# Patient Record
Sex: MALE | Race: OTHER
[De-identification: names, ages, dates, MRNs, and addresses within clinical notes are randomized per-mention and may not be internally consistent; named-entity substitution may affect disease eponyms.]

---

## 2021-12-05 ENCOUNTER — HOSPITAL ENCOUNTER (INPATIENT)
Dept: HOSPITAL 54 - ER | Age: 46
LOS: 3 days | Discharge: HOME | DRG: 720 | End: 2021-12-08
Attending: INTERNAL MEDICINE | Admitting: NURSE PRACTITIONER
Payer: MEDICAID

## 2021-12-05 VITALS — HEIGHT: 68 IN | WEIGHT: 180 LBS | BODY MASS INDEX: 27.28 KG/M2

## 2021-12-05 DIAGNOSIS — B96.20: ICD-10-CM

## 2021-12-05 DIAGNOSIS — Z59.00: ICD-10-CM

## 2021-12-05 DIAGNOSIS — Z20.822: ICD-10-CM

## 2021-12-05 DIAGNOSIS — E66.9: ICD-10-CM

## 2021-12-05 DIAGNOSIS — F10.20: ICD-10-CM

## 2021-12-05 DIAGNOSIS — N10: ICD-10-CM

## 2021-12-05 DIAGNOSIS — E86.0: ICD-10-CM

## 2021-12-05 DIAGNOSIS — E87.2: ICD-10-CM

## 2021-12-05 DIAGNOSIS — K44.9: ICD-10-CM
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
## 2021-12-05 DIAGNOSIS — K57.90: ICD-10-CM

## 2021-12-05 DIAGNOSIS — A41.9: Primary | ICD-10-CM

## 2021-12-05 DIAGNOSIS — E66.01: ICD-10-CM

## 2021-12-05 DIAGNOSIS — N17.0: ICD-10-CM

## 2021-12-05 DIAGNOSIS — F19.10: ICD-10-CM

## 2021-12-05 DIAGNOSIS — F15.10: ICD-10-CM

## 2021-12-05 DIAGNOSIS — D69.6: ICD-10-CM

## 2021-12-05 DIAGNOSIS — E87.1: ICD-10-CM

## 2021-12-05 LAB
ALBUMIN SERPL BCP-MCNC: 3.2 G/DL (ref 3.4–5)
ALP SERPL-CCNC: 87 U/L (ref 46–116)
ALT SERPL W P-5'-P-CCNC: 32 U/L (ref 12–78)
AST SERPL W P-5'-P-CCNC: 19 U/L (ref 15–37)
BASOPHILS # BLD AUTO: 0.1 K/UL (ref 0–0.2)
BASOPHILS NFR BLD AUTO: 0.3 % (ref 0–2)
BILIRUB DIRECT SERPL-MCNC: 0.3 MG/DL (ref 0–0.2)
BILIRUB SERPL-MCNC: 0.7 MG/DL (ref 0.2–1)
BILIRUB UR QL STRIP: NEGATIVE
BUN SERPL-MCNC: 22 MG/DL (ref 7–18)
CALCIUM SERPL-MCNC: 8.8 MG/DL (ref 8.5–10.1)
CHLORIDE SERPL-SCNC: 93 MMOL/L (ref 98–107)
CO2 SERPL-SCNC: 23 MMOL/L (ref 21–32)
COLOR UR: YELLOW
CREAT SERPL-MCNC: 1.5 MG/DL (ref 0.6–1.3)
DEPRECATED SQUAMOUS URNS QL MICRO: (no result) /HPF
EOSINOPHIL NFR BLD AUTO: 0.4 % (ref 0–6)
GLUCOSE SERPL-MCNC: 134 MG/DL (ref 74–106)
GLUCOSE UR STRIP-MCNC: NEGATIVE MG/DL
HCT VFR BLD AUTO: 47 % (ref 39–51)
HGB BLD-MCNC: 16 G/DL (ref 13.5–17.5)
LEUKOCYTE ESTERASE UR QL STRIP: (no result)
LIPASE SERPL-CCNC: 29 U/L (ref 73–393)
LYMPHOCYTES NFR BLD AUTO: 0.5 K/UL (ref 0.8–4.8)
LYMPHOCYTES NFR BLD AUTO: 2.1 % (ref 20–44)
MCHC RBC AUTO-ENTMCNC: 34 G/DL (ref 31–36)
MCV RBC AUTO: 92 FL (ref 80–96)
MONOCYTES NFR BLD AUTO: 1.5 K/UL (ref 0.1–1.3)
MONOCYTES NFR BLD AUTO: 6.5 % (ref 2–12)
NEUTROPHILS # BLD AUTO: 21.6 K/UL (ref 1.8–8.9)
NEUTROPHILS NFR BLD AUTO: 90.7 % (ref 43–81)
NITRITE UR QL STRIP: POSITIVE
PH UR STRIP: 6 [PH] (ref 5–8)
PLATELET # BLD AUTO: 179 K/UL (ref 150–450)
POTASSIUM SERPL-SCNC: 3.6 MMOL/L (ref 3.5–5.1)
PROT SERPL-MCNC: 7.8 G/DL (ref 6.4–8.2)
PROT UR QL STRIP: 100 MG/DL
RBC # BLD AUTO: 5.04 MIL/UL (ref 4.5–6)
RBC #/AREA URNS HPF: (no result) /HPF (ref 0–2)
SODIUM SERPL-SCNC: 130 MMOL/L (ref 136–145)
UROBILINOGEN UR STRIP-MCNC: 2 EU/DL
WBC #/AREA URNS HPF: (no result) /HPF
WBC #/AREA URNS HPF: (no result) /HPF (ref 0–3)
WBC NRBC COR # BLD AUTO: 23.8 K/UL (ref 4.3–11)

## 2021-12-05 PROCEDURE — G0480 DRUG TEST DEF 1-7 CLASSES: HCPCS

## 2021-12-05 PROCEDURE — U0003 INFECTIOUS AGENT DETECTION BY NUCLEIC ACID (DNA OR RNA); SEVERE ACUTE RESPIRATORY SYNDROME CORONAVIRUS 2 (SARS-COV-2) (CORONAVIRUS DISEASE [COVID-19]), AMPLIFIED PROBE TECHNIQUE, MAKING USE OF HIGH THROUGHPUT TECHNOLOGIES AS DESCRIBED BY CMS-2020-01-R: HCPCS

## 2021-12-05 PROCEDURE — G0378 HOSPITAL OBSERVATION PER HR: HCPCS

## 2021-12-05 PROCEDURE — C9803 HOPD COVID-19 SPEC COLLECT: HCPCS

## 2021-12-05 SDOH — ECONOMIC STABILITY - HOUSING INSECURITY: HOMELESSNESS UNSPECIFIED: Z59.00

## 2021-12-05 NOTE — NUR
-------------------------------------------------------------------------------

          *** Note undone in ED - 12/05/21 at 2230 by HUMBERTO ***          

-------------------------------------------------------------------------------

pT IS GOING TO [facility] UNDER THE CARE OF DR. LINDSAY. [facility]

## 2021-12-06 LAB
BASOPHILS # BLD AUTO: 0 K/UL (ref 0–0.2)
BASOPHILS NFR BLD AUTO: 0.3 % (ref 0–2)
BUN SERPL-MCNC: 15 MG/DL (ref 7–18)
CALCIUM SERPL-MCNC: 7.7 MG/DL (ref 8.5–10.1)
CHLORIDE SERPL-SCNC: 99 MMOL/L (ref 98–107)
CO2 SERPL-SCNC: 25 MMOL/L (ref 21–32)
CREAT SERPL-MCNC: 1.4 MG/DL (ref 0.6–1.3)
EOSINOPHIL NFR BLD AUTO: 0 % (ref 0–6)
GLUCOSE SERPL-MCNC: 172 MG/DL (ref 74–106)
HCT VFR BLD AUTO: 40 % (ref 39–51)
HGB BLD-MCNC: 13.9 G/DL (ref 13.5–17.5)
LYMPHOCYTES NFR BLD AUTO: 0.6 K/UL (ref 0.8–4.8)
LYMPHOCYTES NFR BLD AUTO: 3.4 % (ref 20–44)
MAGNESIUM SERPL-MCNC: 2 MG/DL (ref 1.8–2.4)
MCHC RBC AUTO-ENTMCNC: 35 G/DL (ref 31–36)
MCV RBC AUTO: 91 FL (ref 80–96)
MONOCYTES NFR BLD AUTO: 1.3 K/UL (ref 0.1–1.3)
MONOCYTES NFR BLD AUTO: 8 % (ref 2–12)
NEUTROPHILS # BLD AUTO: 15 K/UL (ref 1.8–8.9)
NEUTROPHILS NFR BLD AUTO: 88.3 % (ref 43–81)
PHOSPHATE SERPL-MCNC: 1.7 MG/DL (ref 2.5–4.9)
PLATELET # BLD AUTO: 128 K/UL (ref 150–450)
POTASSIUM SERPL-SCNC: 3.5 MMOL/L (ref 3.5–5.1)
RBC # BLD AUTO: 4.34 MIL/UL (ref 4.5–6)
SODIUM SERPL-SCNC: 131 MMOL/L (ref 136–145)
WBC NRBC COR # BLD AUTO: 16.9 K/UL (ref 4.3–11)

## 2021-12-06 RX ADMIN — SODIUM CHLORIDE PRN MLS/HR: 9 INJECTION, SOLUTION INTRAVENOUS at 15:37

## 2021-12-06 RX ADMIN — ACETAMINOPHEN PRN MG: 325 TABLET ORAL at 11:40

## 2021-12-06 RX ADMIN — HEPARIN SODIUM SCH UNITS: 5000 INJECTION INTRAVENOUS; SUBCUTANEOUS at 20:53

## 2021-12-06 RX ADMIN — DEXTROSE MONOHYDRATE SCH MLS/HR: 50 INJECTION, SOLUTION INTRAVENOUS at 12:52

## 2021-12-06 RX ADMIN — ACETAMINOPHEN PRN MG: 325 TABLET ORAL at 17:01

## 2021-12-06 RX ADMIN — SODIUM CHLORIDE PRN MLS/HR: 9 INJECTION, SOLUTION INTRAVENOUS at 03:00

## 2021-12-06 RX ADMIN — HEPARIN SODIUM SCH UNITS: 5000 INJECTION INTRAVENOUS; SUBCUTANEOUS at 09:38

## 2021-12-06 RX ADMIN — ACETAMINOPHEN PRN MG: 325 TABLET ORAL at 20:29

## 2021-12-06 RX ADMIN — PANTOPRAZOLE SODIUM SCH MG: 40 TABLET, DELAYED RELEASE ORAL at 08:13

## 2021-12-06 NOTE — NUR
RN NOTE



RECEIVED PATIENT FROM ER DEPT. STABLE CONDITION ALERT ORIENTED X 4, AMBULATORY. NO SOB OR 
ANY DISTRESS NOTED AT THIS TIME. IV LINE LEFT AC GAUGE# 18  FLUSHING WELL, RUNNING WITH NS @ 
125 ML/HR. SKIN INTACT. CALL LIGHT WITHIN REACH. BED PLACE IN LOWEST POSITION AND LOCKED. 
SIDE RAILS UP X 2. WILL CONTINUE TO MONITOR.

## 2021-12-06 NOTE — NUR
RN CLOSING NOTES



PT IN BED RESTING COMFORTABLY. PT ALERT/ORIENTED X 4. PT ON RA SAT 96%.NO SOB OR ANY S/SX OF 
DISTRESS NOTED. IV ON LAC RUNNING NS @ 125ML/HR. PT WITH FEVER .6, MAGDALENA NP AWARE, 
TYLENOL GIVEN AT 1700, COOLING MEASURES IN PLACE.  NO EPISODE OF NAUSEA/VOMITING DURING 
SHIFT. ALL SAFETY MEASURES IMPLEMENTED. CALL LIGHT WITHIN REACH. BED IN LOWEST POSITION WITH 
SIDERAILS UP X 2. WILL REPORT TO NIGHT SHIFT RN FOR CONTINUITY OF CARE.

## 2021-12-06 NOTE — NUR
TONY MS OPENING NOTES:

RECEIVED PATIENT FROM DAY SHIFT, PATIENT IN BED, A/O X4, AMBULATORY, L. AC #18 PATENT AND 
INTACT, RUNNING NS  ML/HR. BED IN LOWEST POSITION, SIDE RAILS UP X2, BRAKES LOCKED AND 
IN PLACE, CALL LIGHT WITHIN REACH, WILL CONTINUE TO MONITOR AND IMPLEMENT NURSING 
INTERVENTIONS AS NECESSARY. 

-------------------------------------------------------------------------------

Addendum: 12/07/21 at 0453 by LIGIA VERGARA RN

-------------------------------------------------------------------------------

OPENING NOTES WAS AT 1930

## 2021-12-06 NOTE — NUR
SS Consult:



SS consult for homelessness.

Pt. Is a 46-year-old male.

Pt. demonstrates adequate insight to the reason for hospitalization. 

Pt. was oriented x2, alert, and was not cooperative. During interview, pt. did not make 
appropriate eye-contact, and appeared unkempt. Evidenced by: dirty clothing, messy hair. 
Pt.s speech was at a low rate. 

SW explored pt.s Hx of mental health and substance abuse. 

Pt. reported no Hx of mental health, suicidal or homicidal. Pt. denies auditory 
hallucinations, visual hallucinations, paranoia, or delusions. Pt. reported having Hx of 
substance use [alcohol]. 

SW explored pt.s living situation. Per pt., he lives at [facility] shelter [pt. 
could not provide address]. SW provided homeless resources and pt. denied.



Plan: SW provided available resources and pt. denied. Per pt., he is living in a shelter and 
does not need additional resources.

Once discharge, per pt., he will return to [facility]. 



Resources Provided:



[place] [facility]: 

SP[facility] 2 [facility]

Address: Confidential  (call for location )

Phone Number: [phone]

Population Served: [place]

# of Beds: 57



SPA 4 | [facility]

Provider: [facility]

Address: [address] [place] [address] 

# of Beds: 49

Phone Number: [phone]

Population Served: [place]



[facility] | [facility]

Provider: [facility]

Address: [address] [place] [address] 

# of Beds: 49

Phone Number: [phone]

Population Served: [place]



[facility]

Provider: Rory Joseph [facility]

Address: [address] [facility] [address]

# of Beds: 20

Phone Number: [phone]

Population Served: Women



[facility]

Provider: [facility]

Address: [address] [address]

# of Beds: 30

Phone Number: [phone]

Population Served: Women



SPA 8 | [facility]

Provider: Rob potter [place]mi

Address: [address] [address]

# of Beds: 65

Phone Number: [phone]

Population Served: [place]





Year-round shelters: [facility] [facility] [address] [place], [place] [address] [phone]; 
[facility] [address] [facility] [place] [address]; [facility] [id number] [facility] [address] [phone]

Winter Shelters: 

[facility] [place]

Provider: Rob of [place]

Address: [address] [place], [address]

# of Beds: 47

Phone Number: [phone]

Population Served: [facility] | [facility]

 

Yen Villas[facility] [place]

Provider: [facility]

Address: [address], [address] 

# of Beds: 66

Phone Number: [phone]

Population Served: [facility]

 

[employer] [place]

Provider: [facility]

Address: [address], [address]

# of Beds: 56

Phone Number: [phone]

Population Served: [facility]

 

[facility]adde[facility] [place] 

Provider: /[facility]

Address: [address] [place] [address], [address]

# of Beds: 49

Phone Number: [phone]

Population Served: [facility]

 

[facility] | [facility]

Provider: [facility]

Address: [address] [place] [address], [address]

# of Beds: 37

Phone Number: [phone]

Population Served: [facility]



Hygiene: [facility]: [address] [place] [phone]; [facility] 
[address] [facility] [phone]; [facility] [address], [place] [phone].

Food Resources: [place] [facility] at hospitals- [address] 
[place]; [facility] Each Need [facility] ([facility]) [address]; [facility] [address] [facility]; [facility] 
[address] [facility]. 

Mental Health resources provided: [facility] [address] [place] [address] [phone]; [facility] [address] [facility] [address], 
[place], [place] [id number] [phone]; [facility] 
[address] [place] [address] [phone]; [facility] [address] 
[place], [place] [address] [phone]

Healthcare Clinics: [facility] [address] [facility] [place]. 
[place] [phone]; [facility] [address] [facility] [facility] [facility] [address] 
[place] [address]; [facility] [address] [facility]. [place] [address] [phone]



Counseling--Outpatient

[facility]

[address] [place]

[place] [address] [phone]

(Specializes in in-depth psychotherapy for emotional distress: anxiety, depression, 
interpersonal conflicts, life transitions, childhood abuse)



[facility]

[address] [place], [place] [address] [phone]

(Assist with solving problem marital difficulties, separation & divorce, aging parents, 
death & grief, chronic & terminal illness)



[facility]

[address] [place], [place] [address] [phone]

(Deal with loss & grief, anxiety, marital difficulties) 



Homebound/Mental Health Services

[address] [facility] [address]

[place], [place] [address] [phone]

(Provide in-home mental services to people who are incapable of leaving their homes)



Organization for Needs of the Elderly

Senior Service/Resource Center

[address]

[place] [address] [phone]



[facility] 

[address]

[place], [place] [address] [phone]





Substance Abuse resources provided included: [facility] Substance Abuse 
Self-Helpline (SASH) [phone]; [facility] [address]. [place]. [place] [address] 
[phone]; [facility] [address] [facility] [address] [phone]; 
[facility] [address] [facility] [place] [address] 
[phone]; [facility] [address] [facility] [address] [phone];  [facility] [address] [facility] [address] 
[phone]; [facility] [address] [facility] [address] [phone]; [facility] Substance Abuse Helpline(SAS)[facility] [phone]; [facility]  
[phone]; [facility] [phone] [place]; [facility]  [phone] [place]; [facility]  [phone] [place]; [facility] 
[phone] [facility]; [facility]  [phone] [place]; [facility] [phone] [place]; [facility] [place] [phone] [place]; [facility] 
[phone] [place]; Alcoholics Anonymous [phone]) 714-3448-SFV; [facility] [phone]; Marijuana Anonymous [phone]) 890-9999-SFV; Narcotics Anonymous www.na.org;

## 2021-12-07 VITALS — SYSTOLIC BLOOD PRESSURE: 138 MMHG | DIASTOLIC BLOOD PRESSURE: 70 MMHG

## 2021-12-07 VITALS — DIASTOLIC BLOOD PRESSURE: 70 MMHG | SYSTOLIC BLOOD PRESSURE: 135 MMHG

## 2021-12-07 LAB
BUN SERPL-MCNC: 11 MG/DL (ref 7–18)
CALCIUM SERPL-MCNC: 8.2 MG/DL (ref 8.5–10.1)
CHLORIDE SERPL-SCNC: 101 MMOL/L (ref 98–107)
CO2 SERPL-SCNC: 26 MMOL/L (ref 21–32)
CREAT SERPL-MCNC: 1.2 MG/DL (ref 0.6–1.3)
GLUCOSE SERPL-MCNC: 127 MG/DL (ref 74–106)
PHOSPHATE SERPL-MCNC: 2 MG/DL (ref 2.5–4.9)
POTASSIUM SERPL-SCNC: 3.4 MMOL/L (ref 3.5–5.1)
SODIUM SERPL-SCNC: 135 MMOL/L (ref 136–145)

## 2021-12-07 PROCEDURE — 05HB33Z INSERTION OF INFUSION DEVICE INTO RIGHT BASILIC VEIN, PERCUTANEOUS APPROACH: ICD-10-PCS | Performed by: NURSE PRACTITIONER

## 2021-12-07 RX ADMIN — HEPARIN SODIUM SCH UNITS: 5000 INJECTION INTRAVENOUS; SUBCUTANEOUS at 08:16

## 2021-12-07 RX ADMIN — PANTOPRAZOLE SODIUM SCH MG: 40 TABLET, DELAYED RELEASE ORAL at 08:15

## 2021-12-07 RX ADMIN — SODIUM CHLORIDE PRN MLS/HR: 9 INJECTION, SOLUTION INTRAVENOUS at 23:01

## 2021-12-07 RX ADMIN — SODIUM CHLORIDE PRN MLS/HR: 9 INJECTION, SOLUTION INTRAVENOUS at 03:03

## 2021-12-07 RX ADMIN — DEXTROSE MONOHYDRATE SCH MLS/HR: 50 INJECTION, SOLUTION INTRAVENOUS at 12:27

## 2021-12-07 RX ADMIN — SODIUM CHLORIDE PRN MLS/HR: 9 INJECTION, SOLUTION INTRAVENOUS at 12:27

## 2021-12-07 RX ADMIN — ACETAMINOPHEN PRN MG: 325 TABLET ORAL at 05:26

## 2021-12-07 RX ADMIN — HEPARIN SODIUM SCH UNITS: 5000 INJECTION INTRAVENOUS; SUBCUTANEOUS at 20:02

## 2021-12-07 RX ADMIN — ACETAMINOPHEN PRN MG: 325 TABLET ORAL at 15:14

## 2021-12-07 NOTE — NUR
tele rn ote 

 rounds made , all need attended, cont on ivf as ordered , no sob will cont to monitor , 
call light within reach

## 2021-12-07 NOTE — NUR
MS RN NOTE 

 PATIENT IN BED , ALERT ORIENTED, ON RA NO SOB NOTED AT THIS TIME, LT FA HL INTACT AND ON 
IVF AS ORDERED,  BED IN LOWEST AND LOCKED POSITION , PLAN OF CARE DISCUSSED WITH PATIENT , 
ALL NEEDS ATTENDED , CALL LIGHT WITHIN REACH, WILL MONITOR

## 2021-12-07 NOTE — NUR
cherrie monreal 

 st at bedside ok to cont puree dit 

-------------------------------------------------------------------------------

Addendum: 12/07/21 at 1016 by KAREN TRENT RN

-------------------------------------------------------------------------------

solange gamez

## 2021-12-07 NOTE — NUR
RN MED SURG CLOSING NOTES:

PATIENT IN BED, SLEEPING, A/O X4, TEMPERATURE 100.6 AT 0400, TYLENOL GIVEN, 1600 CC URINE 
OUTPUT USING URINAL, ON ROOM AIR, SATURATING 96%, RUNNING NS  ML/HR, L. FOREARM #20 
PATENT AND INTACT.BED AT LOWEST POSITION, BRAKES LOCKED AND IN PLACE, SIDE RAILS UP X2, CALL 
LIGHT WITHIN REACH, WILL CONTINUE TO MONITOR AND ENDORSE TO DAY SHIFT NURSE.

## 2021-12-07 NOTE — NUR
RN MED SURG OPENING NOTES:

RECEIVED PT ON BED AWAKE ALERT ORIENTED X4 ON ROOM AIR, SATURATION 99% NO SIGN OF 
RESPIRATORY DISTRESS, NO SOB NOTED, TELE MONITOR READS SINUS RHYTHM 80-90'S, HAVE ZI 
MIDLINE  PATENT AND FLUSHED WITH ONGOING NS @ 125ML/HR INFUSING WELL ABLE TO WALK TO THE 
BATHROOM TO URINATE, BED ON LOWEST POSITION AND LOCKED SIDE RAILS UP X2 CALL LIGHT WITHIN 
REACH WILL CONT TO MONITOR

## 2021-12-07 NOTE — NUR
RECEIVED PT ON BED AWAKE ALERT ORIENTED X3 ON ROOM AIR NO SIGN OF RESPIRATORY DISTRESS, TELE 
MONITOR READS SINUS RHYTHM 90'S HR 90'S, HAVE ZI MIDLINE  PATENT AND FLUSHED WITH ONGOING 
NS @ 125ML/HR INFUSING WELL ABLE TO WALK TO THE BATHROOM TO URINATE, BED ON LOWEST POSITION 
AND LOCKED SIDE RAILS UP X2 CALL LIGHT WITHIN REACH WILL CONT TO

## 2021-12-08 VITALS — SYSTOLIC BLOOD PRESSURE: 127 MMHG | DIASTOLIC BLOOD PRESSURE: 75 MMHG

## 2021-12-08 LAB
ALBUMIN SERPL BCP-MCNC: 2.1 G/DL (ref 3.4–5)
ALP SERPL-CCNC: 104 U/L (ref 46–116)
ALT SERPL W P-5'-P-CCNC: 52 U/L (ref 12–78)
AST SERPL W P-5'-P-CCNC: 42 U/L (ref 15–37)
BASOPHILS # BLD AUTO: 0 K/UL (ref 0–0.2)
BASOPHILS NFR BLD AUTO: 0.2 % (ref 0–2)
BILIRUB SERPL-MCNC: 0.4 MG/DL (ref 0.2–1)
BUN SERPL-MCNC: 11 MG/DL (ref 7–18)
CALCIUM SERPL-MCNC: 7.7 MG/DL (ref 8.5–10.1)
CHLORIDE SERPL-SCNC: 102 MMOL/L (ref 98–107)
CO2 SERPL-SCNC: 26 MMOL/L (ref 21–32)
CREAT SERPL-MCNC: 1.2 MG/DL (ref 0.6–1.3)
EOSINOPHIL NFR BLD AUTO: 0.3 % (ref 0–6)
GLUCOSE SERPL-MCNC: 111 MG/DL (ref 74–106)
HCT VFR BLD AUTO: 38 % (ref 39–51)
HGB BLD-MCNC: 13.4 G/DL (ref 13.5–17.5)
LYMPHOCYTES NFR BLD AUTO: 0.9 K/UL (ref 0.8–4.8)
LYMPHOCYTES NFR BLD AUTO: 7.8 % (ref 20–44)
MAGNESIUM SERPL-MCNC: 1.9 MG/DL (ref 1.8–2.4)
MCHC RBC AUTO-ENTMCNC: 35 G/DL (ref 31–36)
MCV RBC AUTO: 92 FL (ref 80–96)
MONOCYTES NFR BLD AUTO: 1.3 K/UL (ref 0.1–1.3)
MONOCYTES NFR BLD AUTO: 11.1 % (ref 2–12)
NEUTROPHILS # BLD AUTO: 9.7 K/UL (ref 1.8–8.9)
NEUTROPHILS NFR BLD AUTO: 80.6 % (ref 43–81)
PHOSPHATE SERPL-MCNC: 2.4 MG/DL (ref 2.5–4.9)
PLATELET # BLD AUTO: 139 K/UL (ref 150–450)
POTASSIUM SERPL-SCNC: 3.4 MMOL/L (ref 3.5–5.1)
PROT SERPL-MCNC: 6.1 G/DL (ref 6.4–8.2)
RBC # BLD AUTO: 4.16 MIL/UL (ref 4.5–6)
SODIUM SERPL-SCNC: 135 MMOL/L (ref 136–145)
WBC NRBC COR # BLD AUTO: 12.1 K/UL (ref 4.3–11)

## 2021-12-08 RX ADMIN — PANTOPRAZOLE SODIUM SCH MG: 40 TABLET, DELAYED RELEASE ORAL at 07:43

## 2021-12-08 RX ADMIN — DEXTROSE MONOHYDRATE SCH MLS/HR: 50 INJECTION, SOLUTION INTRAVENOUS at 12:11

## 2021-12-08 RX ADMIN — SODIUM CHLORIDE PRN MLS/HR: 9 INJECTION, SOLUTION INTRAVENOUS at 07:34

## 2021-12-08 RX ADMIN — HEPARIN SODIUM SCH UNITS: 5000 INJECTION INTRAVENOUS; SUBCUTANEOUS at 08:37

## 2021-12-08 NOTE — NUR
RN NOTE

REPORT REC'D FR. LEANDRO JIMENEZ AT BEDSIDE. PT. A/A/OX4. ON RA .NO SOB. DENIES ANY NEED OR 
DISCOMFORT AT THIS TIME. IVF INFUSING TO LFA MIDLINE WITHOUT DIFFICULTY. SAFETY MEASURES 
OBSERVED.CALL LIGHT WITHIN REACH. WILL CONTINUE TO MONITOR.

## 2021-12-08 NOTE — NUR
RN NOTE

WITH ORDER FOR DISCHARGE. DC INSTRUCTIONS GIVEN TO PT. IMPORTANCE OF COMPLETING THE 
ANTIBIOTICS AS ORDERED WAS EMPHASIZED.PT VERBALIZED UNDERSTANDING. MIDLINE DISCONTINUED. NO 
BLEEDING PRESENT. PRESSURE DRESSING APPLIED. ALL QUESTIONS ANSWERED. PT WILL LEAVE AFTER 
LUNCH.

## 2021-12-08 NOTE — NUR
RN MED SURG CLOSING NOTES:

PATIENT IN BED, A/O X4, L. FOREARM #20, PATENT AND INTACT, ZI MIDLINE PATENT AND INTACT, 
RUNNING NS  ML/HR, TOLERATING WELL, NO SOB, NO DISTRESS NOTED, BED AT LOWEST POSITION, 
BRAKES LOCKED AND IN POSITION, SIDE RAILS UP X2, CALL LIGHT WITHIN REACH, WILL CONTINUE TO 
MONITOR AND IMPLEMENT NURSING INTERVENTIONS, WILL ENDORSE TO DAY SHIFT NURSE.

## 2021-12-08 NOTE — NUR
SERGIO met with pt. Per pt., he will go back to [facility] once discharged. SERGIO spoke 
with nurse to notify SW when pt. gets discharged. Pt. requested a bus card. SERGIO will provide 
once pt. is getting ready for discharged.

## 2021-12-08 NOTE — NUR
RN NOTE

PT LEFT THE HOSPITAL. AMBULATORY. WALKED BY NIMA SCOTT TO THE MAIN LOBBY. NO SOB. DISCHARGED 
IN STABLE CONDITION WITH ALL HIS BELONGINGS..

## 2022-02-04 NOTE — NUR
--new onset; suspect secondary to acute illness  --checking 2D echo  --continue full dose anticoagulation   GAVE REPORT TO TONY ADAIR FOR CONCETTA

## 2022-03-09 ENCOUNTER — HOSPITAL ENCOUNTER (EMERGENCY)
Dept: HOSPITAL 54 - ER | Age: 47
LOS: 1 days | Discharge: HOME | End: 2022-03-10
Payer: MEDICAID

## 2022-03-09 VITALS — BODY MASS INDEX: 22.9 KG/M2 | HEIGHT: 70 IN | WEIGHT: 160 LBS

## 2022-03-09 DIAGNOSIS — F10.229: Primary | ICD-10-CM

## 2022-03-09 DIAGNOSIS — Z59.00: ICD-10-CM
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
## 2022-03-09 SDOH — ECONOMIC STABILITY - HOUSING INSECURITY: HOMELESSNESS UNSPECIFIED: Z59.00

## 2022-03-09 NOTE — NUR
DEREK FROM [facility] C/O NAUSEA S/P "DRINKING 12 BEERS TODAY". CHANGED 
INTO GOWN AND PLACED ON MONITOR AND V/S STABLE.

## 2022-03-10 VITALS — DIASTOLIC BLOOD PRESSURE: 71 MMHG | SYSTOLIC BLOOD PRESSURE: 132 MMHG
